# Patient Record
Sex: FEMALE
[De-identification: names, ages, dates, MRNs, and addresses within clinical notes are randomized per-mention and may not be internally consistent; named-entity substitution may affect disease eponyms.]

---

## 2023-03-22 ENCOUNTER — NURSE TRIAGE (OUTPATIENT)
Dept: OTHER | Facility: CLINIC | Age: 73
End: 2023-03-22

## 2023-03-22 NOTE — TELEPHONE ENCOUNTER
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 Incision was on her abdomen. Started PT at home. Painful getting out of bed and hard to walk. Stopped her Oxycodone. Bilateral leg swelling, L more swollen than R and L leg is also warm to touch. Swelling extends to her hips. Has a follow up appt with surgeon tomorrow. Associated Symptoms: reduced activity    Pain Severity: 8/10; ; constant, severe    Temperature: denies fever     What has been tried: Tylenol 1000 mg every 5-6 hours. Ice every 15 minutes. Recommended disposition: Go to ED/[facility] Now (Or to Office with PCP Approval)    Care advice provided, patient verbalizes understanding; denies any other questions or concerns. Outcome: Patient/caller agrees to proceed to   Emergency Department if she is unable to speak to or see her surgeon today.        This triage is a result of a call to the [address] [place] [address]    Reason for Disposition   Sounds like a serious complication to the triager    Protocols used: Post-Op Symptoms and Questions-ADULT-[place]